# Patient Record
Sex: FEMALE | URBAN - METROPOLITAN AREA
[De-identification: names, ages, dates, MRNs, and addresses within clinical notes are randomized per-mention and may not be internally consistent; named-entity substitution may affect disease eponyms.]

---

## 2017-01-09 PROBLEM — A04.72 CLOSTRIDIUM DIFFICILE COLITIS: Status: ACTIVE | Noted: 2017-01-09

## 2018-03-21 PROBLEM — F98.8 ATTENTION DEFICIT DISORDER, UNSPECIFIED HYPERACTIVITY PRESENCE: Status: ACTIVE | Noted: 2018-03-21

## 2018-04-11 PROBLEM — F41.0 PANIC ATTACKS: Status: ACTIVE | Noted: 2018-04-11

## 2018-04-11 PROBLEM — F98.8 ATTENTION DEFICIT DISORDER PREDOMINANT INATTENTIVE TYPE: Status: ACTIVE | Noted: 2018-03-21

## 2018-09-05 PROCEDURE — 80307 DRUG TEST PRSMV CHEM ANLYZR: CPT | Performed by: NURSE PRACTITIONER

## 2018-09-05 PROCEDURE — 80324 DRUG SCREEN AMPHETAMINES 1/2: CPT | Performed by: NURSE PRACTITIONER

## 2019-03-27 PROCEDURE — 87081 CULTURE SCREEN ONLY: CPT | Performed by: NURSE PRACTITIONER

## 2019-04-11 PROCEDURE — 88175 CYTOPATH C/V AUTO FLUID REDO: CPT | Performed by: OBSTETRICS & GYNECOLOGY

## 2019-04-11 PROCEDURE — 87624 HPV HI-RISK TYP POOLED RSLT: CPT | Performed by: OBSTETRICS & GYNECOLOGY

## 2021-05-25 PROBLEM — Z91.89 AT HIGH RISK FOR BREAST CANCER: Status: ACTIVE | Noted: 2021-05-25

## 2022-06-27 ENCOUNTER — APPOINTMENT (OUTPATIENT)
Dept: URBAN - METROPOLITAN AREA CLINIC 244 | Age: 42
Setting detail: DERMATOLOGY
End: 2022-06-27

## 2022-06-27 DIAGNOSIS — D17 BENIGN LIPOMATOUS NEOPLASM: ICD-10-CM

## 2022-06-27 DIAGNOSIS — L72.0 EPIDERMAL CYST: ICD-10-CM

## 2022-06-27 PROBLEM — D17.1 BENIGN LIPOMATOUS NEOPLASM OF SKIN AND SUBCUTANEOUS TISSUE OF TRUNK: Status: ACTIVE | Noted: 2022-06-27

## 2022-06-27 PROCEDURE — OTHER ADDITIONAL NOTES: OTHER

## 2022-06-27 PROCEDURE — OTHER COUNSELING: OTHER

## 2022-06-27 PROCEDURE — 99203 OFFICE O/P NEW LOW 30 MIN: CPT

## 2022-06-27 ASSESSMENT — LOCATION DETAILED DESCRIPTION DERM
LOCATION DETAILED: LEFT RIB CAGE
LOCATION DETAILED: SUBXIPHOID

## 2022-06-27 ASSESSMENT — LOCATION ZONE DERM: LOCATION ZONE: TRUNK

## 2022-06-27 ASSESSMENT — LOCATION SIMPLE DESCRIPTION DERM: LOCATION SIMPLE: ABDOMEN

## 2022-06-27 NOTE — PROCEDURE: ADDITIONAL NOTES
Additional Notes: Recommended  to a general surgeon for removal
Render Risk Assessment In Note?: no
Additional Notes: Demonstrated ultrasound of the left axilla done [facility]
Detail Level: Simple

## 2022-11-11 ENCOUNTER — APPOINTMENT (OUTPATIENT)
Dept: GENERAL RADIOLOGY | Age: 42
End: 2022-11-11
Attending: EMERGENCY MEDICINE
Payer: COMMERCIAL

## 2022-11-11 ENCOUNTER — HOSPITAL ENCOUNTER (OUTPATIENT)
Age: 42
Discharge: HOME | End: 2022-11-11
Attending: EMERGENCY MEDICINE
Payer: COMMERCIAL

## 2022-11-11 VITALS
SYSTOLIC BLOOD PRESSURE: 148 MMHG | DIASTOLIC BLOOD PRESSURE: 73 MMHG | OXYGEN SATURATION: 98 % | TEMPERATURE: 98 F | HEART RATE: 91 BPM | RESPIRATION RATE: 20 BRPM

## 2022-11-11 DIAGNOSIS — S93.401A SPRAIN OF RIGHT ANKLE, UNSPECIFIED LIGAMENT, INITIAL ENCOUNTER: Primary | ICD-10-CM

## 2022-11-11 PROCEDURE — 99203 OFFICE O/P NEW LOW 30 MIN: CPT

## 2022-11-11 PROCEDURE — 99213 OFFICE O/P EST LOW 20 MIN: CPT

## 2022-11-11 PROCEDURE — 73610 X-RAY EXAM OF ANKLE: CPT | Performed by: EMERGENCY MEDICINE

## 2022-11-11 NOTE — ED INITIAL ASSESSMENT (HPI)
Patient with right ankle pain and swelling after stepping awkwardly down the stairs yesterday. Pain increases with weight bearing.

## 2022-12-08 ENCOUNTER — TELEPHONE (OUTPATIENT)
Dept: ORTHOPEDICS CLINIC | Facility: CLINIC | Age: 42
End: 2022-12-08

## 2022-12-08 NOTE — TELEPHONE ENCOUNTER
Left message for patient advising of cancellation with Dr. Melani Hernandez. Also asked which ankle she needs to be seen for at appointment with Joanne Braxton..

## 2023-03-20 ENCOUNTER — TELEPHONE (OUTPATIENT)
Dept: SURGERY | Facility: CLINIC | Age: 43
End: 2023-03-20

## 2023-04-13 ENCOUNTER — OFFICE VISIT (OUTPATIENT)
Dept: SURGERY | Facility: CLINIC | Age: 43
End: 2023-04-13

## 2023-04-13 DIAGNOSIS — L72.0 INCLUSION CYST: ICD-10-CM

## 2023-04-13 DIAGNOSIS — D21.3 BENIGN NEOPLASM OF CONNECTIVE AND SOFT TISSUE OF THORAX: Primary | ICD-10-CM

## 2023-04-13 PROCEDURE — 99204 OFFICE O/P NEW MOD 45 MIN: CPT | Performed by: PLASTIC SURGERY

## 2023-04-13 RX ORDER — CLOTRIMAZOLE AND BETAMETHASONE DIPROPIONATE 10; .64 MG/G; MG/G
1 CREAM TOPICAL 2 TIMES DAILY PRN
COMMUNITY
Start: 2023-03-14

## 2025-01-23 ENCOUNTER — HOSPITAL ENCOUNTER (OUTPATIENT)
Age: 45
Discharge: HOME | End: 2025-01-23
Attending: EMERGENCY MEDICINE | Admitting: EMERGENCY MEDICINE
Payer: COMMERCIAL

## 2025-01-23 VITALS
HEART RATE: 95 BPM | TEMPERATURE: 98 F | DIASTOLIC BLOOD PRESSURE: 74 MMHG | SYSTOLIC BLOOD PRESSURE: 145 MMHG | RESPIRATION RATE: 18 BRPM | OXYGEN SATURATION: 100 %

## 2025-01-23 DIAGNOSIS — S71.152A DOG BITE OF LEFT THIGH, INITIAL ENCOUNTER: Primary | ICD-10-CM
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
## 2025-01-23 PROCEDURE — 99213 OFFICE O/P EST LOW 20 MIN: CPT

## 2025-01-23 RX ORDER — CEFADROXIL 500 MG/1
500 CAPSULE ORAL 2 TIMES DAILY
Qty: 14 CAPSULE | Refills: 0 | Status: SHIPPED | OUTPATIENT
Start: 2025-01-23 | End: 2025-01-30

## 2025-01-23 RX ORDER — VANCOMYCIN HYDROCHLORIDE 125 MG/1
125 CAPSULE ORAL DAILY
Qty: 14 CAPSULE | Refills: 0 | Status: SHIPPED | OUTPATIENT
Start: 2025-01-23 | End: 2025-02-06

## 2025-01-24 NOTE — ED PROVIDER NOTES
Patient Seen in: [facility] [place]      History     Chief Complaint   Patient presents with    Bite     Stated Complaint: Bit by a dog    Subjective:     44-year-old female presents today for evaluation of dog bite.  Patient reports she is a .  Was bit by a dog on the back of her left leg.  Patient had 3 pairs of pants on and there are no cuts or holes in the pants.  Complains of mild discomfort to the back of her left thigh.  Last tetanus shot was 2018.  Dog's shots are not up-to-date.    Objective:   Past Medical History:    ANEMIA    recently dx     Attention deficit disorder, unspecified hyperactivity presence    Carbuncle and furuncle    Clostridium difficile colitis    Pancreatic pseudocyst (HCC)    Pancreatitis, acute (HCC)    Skin tag    Solar lentigo    Ulcerative colitis (HCC)              Past Surgical History:   Procedure Laterality Date    Cholecystectomy      Colonoscopy  9/2014    confluent moderate colitis from rectum to mid/proximal transverse (55-60 cm on withdrawal), no dysplasia on bx. Unremarkable ascending/cecum (bx w/out active colitis) and terminal ileum. Inflammatory polyps. repeat 1 year    Colonoscopy  7/2015    diffuse, pan colitis (mild on path), inflammatory polyps. nl ti. Did well w/ conscious sedation    Colonoscopy,biopsy N/A 9/19/2014    Procedure: COLONOSCOPY, POSSIBLE BIOPSY, POSSIBLE POLYPECTOMY 72277;  Surgeon: Yanick Whitehead MD;  Location: [facility]    Colonoscopy,biopsy N/A 7/29/2015    Procedure: COLONOSCOPY, POSSIBLE BIOPSY, POSSIBLE POLYPECTOMY 93094;  Surgeon: Yanick Whitehead MD;  Location: [facility]    Colonoscopy,remv lesn,snare N/A 7/29/2015    Procedure: COLONOSCOPY, POSSIBLE BIOPSY, POSSIBLE POLYPECTOMY 69296;  Surgeon: Yanick Whitehead MD;  Location: [facility]    D & c      Laparoscopic cholecystectomy  3/22/2011    Performed by BELINDA BOWIE at [facility]    Needle biopsy liver   3/22/2011    Performed by BELINDA BOWIE at [facility]    Other surgical history      resection of ovarian cysts    Sigmoidoscopy,biopsy  10/19/2010    Performed by THAI KAUR at [facility]    Sigmoidoscopy,biopsy  2012    Procedure: FLEXIBLE SIGMOIDOSCOPY WITH BIOPSY, POLYPECTOMY;  Surgeon: Thai Kaur MD;  Location: [facility]    X-ray oper cholangiogram  3/22/2011    Performed by BELINDA BOWIE at [facility]                Social History     Socioeconomic History    Marital status:    Tobacco Use    Smoking status: Former     Current packs/day: 0.00     Types: Cigarettes     Quit date: 2007     Years since quittin.0    Smokeless tobacco: Never    Tobacco comments:        Vaping Use    Vaping status: Never Used   Substance and Sexual Activity    Alcohol use: No    Drug use: No   Other Topics Concern    Right Handed Yes                Physical Exam     ED Triage Vitals [25 1755]   /74   Pulse 95   Resp 18   Temp 98 °F (36.7 °C)   Temp src Oral   SpO2 100 %   O2 Device None (Room air)       Current:/74   Pulse 95   Temp 98 °F (36.7 °C) (Oral)   Resp 18   SpO2 100%         Physical Exam  Vitals reviewed.   Constitutional:       General: She is not in acute distress.     Appearance: Normal appearance. She is not ill-appearing or toxic-appearing.   HENT:      Head: Normocephalic and atraumatic.      Mouth/Throat:      Pharynx: No pharyngeal swelling.   Eyes:      Conjunctiva/sclera: Conjunctivae normal.   Musculoskeletal:      Cervical back: Neck supple.        Legs:    Skin:     General: Skin is warm and dry.   Neurological:      Mental Status: She is alert and oriented to person, place, and time.      Gait: Gait normal.   Psychiatric:         Mood and Affect: Mood normal.         ED Course   Labs Reviewed - No data to display  Imaging:  No results found.                 MDM        44 year old female  with dog bite on the back of the left leg.  Pants were not punctured.  Likely from crush/tearing motion of the jaws of the dog.  No saliva exposure.  No risk of rabies.  Very low risk of infection low risk of infection.  Patient on Keflex 500 mg every other day for carbuncles.  Patient has history of C. difficile colitis.  Will place on treatment dose of Duricef and vancomycin to prevent C. difficile.  Patient will hold Keflex during Duricef treatment.    Differential diagnosis (including but not limited to):  Dog bite, skin abrasion, contusion    ED course:  Pulse Ox: 100% on room air which is normal      Comment: Please note this report has been produced using speech recognition software and may contain errors related to that system including errors in grammar, punctuation, and spelling, as well as words and phrases that may be inappropriate. If there are any questions or concerns please feel free to contact the dictating provider for clarification.          Medical Decision Making      Disposition and Plan     Clinical Impression:  1. Dog bite of left thigh, initial encounter         Disposition:  Discharge  1/23/2025  6:26 pm    Follow-up:  Sierra Diaz MD  [address] [place] [address]  [phone]    Schedule an appointment as soon as possible for a visit             Medications Prescribed:  Current Discharge Medication List        START taking these medications    Details   cefadroxil 500 MG Oral Cap Take 1 capsule (500 mg total) by mouth 2 (two) times daily for 7 days.  Qty: 14 capsule, Refills: 0      vancomycin 125 MG Oral Cap Take 1 capsule (125 mg total) by mouth daily for 14 days.  Qty: 14 capsule, Refills: 0                 Supplementary Documentation:                                                       Calvin Piper MD  1/23/2025  6:20 PM

## 2025-01-24 NOTE — ED INITIAL ASSESSMENT (HPI)
Jennifer arrives ambulatory with c/o left leg dog bite while at work as a . Reports she was wearing 3 layers of pants and there are no holes in her clothing.